# Patient Record
Sex: FEMALE | Race: BLACK OR AFRICAN AMERICAN | NOT HISPANIC OR LATINO | URBAN - METROPOLITAN AREA
[De-identification: names, ages, dates, MRNs, and addresses within clinical notes are randomized per-mention and may not be internally consistent; named-entity substitution may affect disease eponyms.]

---

## 2022-05-09 NOTE — PATIENT DISCUSSION
Patient going home with Trial. If patient appreciates the new brand she can call to finalize, if she would like to evaluate further she can schedule a CL Check.

## 2023-04-18 ENCOUNTER — FOLLOW UP (OUTPATIENT)
Dept: URBAN - METROPOLITAN AREA CLINIC 53 | Facility: CLINIC | Age: 88
End: 2023-04-18

## 2023-04-18 DIAGNOSIS — H40.1134: ICD-10-CM

## 2023-04-18 PROCEDURE — 92012 INTRM OPH EXAM EST PATIENT: CPT

## 2023-04-18 PROCEDURE — 92133 CPTRZD OPH DX IMG PST SGM ON: CPT

## 2023-04-18 ASSESSMENT — KERATOMETRY
OD_AXISANGLE2_DEGREES: 154
OS_AXISANGLE_DEGREES: 086
OD_K2POWER_DIOPTERS: 45.25
OD_K1POWER_DIOPTERS: 42.25
OD_AXISANGLE_DEGREES: 064
OS_K1POWER_DIOPTERS: 44.25
OS_K2POWER_DIOPTERS: 45.25
OS_AXISANGLE2_DEGREES: 176

## 2023-04-18 ASSESSMENT — VISUAL ACUITY
OS_CC: 20/30
OD_CC: 20/30-1
OU_CC: 20/30-1

## 2023-04-18 ASSESSMENT — TONOMETRY
OD_IOP_MMHG: 15
OD_IOP_MMHG: 18
OS_IOP_MMHG: 18
OS_IOP_MMHG: 15

## 2023-10-17 ENCOUNTER — FOLLOW UP (OUTPATIENT)
Dept: URBAN - METROPOLITAN AREA CLINIC 53 | Facility: CLINIC | Age: 88
End: 2023-10-17

## 2023-10-17 DIAGNOSIS — H31.011: ICD-10-CM

## 2023-10-17 DIAGNOSIS — H40.1131: ICD-10-CM

## 2023-10-17 PROCEDURE — 92083 EXTENDED VISUAL FIELD XM: CPT

## 2023-10-17 PROCEDURE — 92133 CPTRZD OPH DX IMG PST SGM ON: CPT

## 2023-10-17 PROCEDURE — 99213 OFFICE O/P EST LOW 20 MIN: CPT

## 2023-10-17 ASSESSMENT — TONOMETRY
OD_IOP_MMHG: 19
OS_IOP_MMHG: 15
OD_IOP_MMHG: 16
OS_IOP_MMHG: 18

## 2023-10-17 ASSESSMENT — VISUAL ACUITY
OS_CC: 20/30-1
OD_CC: 20/40-2

## 2024-07-09 ENCOUNTER — COMPREHENSIVE EXAM (OUTPATIENT)
Dept: URBAN - METROPOLITAN AREA CLINIC 53 | Facility: CLINIC | Age: 89
End: 2024-07-09

## 2024-07-09 DIAGNOSIS — H31.011: ICD-10-CM

## 2024-07-09 DIAGNOSIS — H40.1131: ICD-10-CM

## 2024-07-09 DIAGNOSIS — H35.3121: ICD-10-CM

## 2024-07-09 DIAGNOSIS — H02.403: ICD-10-CM

## 2024-07-09 DIAGNOSIS — H52.4: ICD-10-CM

## 2024-07-09 DIAGNOSIS — Z96.1: ICD-10-CM

## 2024-07-09 PROCEDURE — 92134 CPTRZ OPH DX IMG PST SGM RTA: CPT

## 2024-07-09 PROCEDURE — 92015 DETERMINE REFRACTIVE STATE: CPT

## 2024-07-09 PROCEDURE — 99213 OFFICE O/P EST LOW 20 MIN: CPT

## 2024-07-09 ASSESSMENT — TONOMETRY
OD_IOP_MMHG: 19
OS_IOP_MMHG: 16
OS_IOP_MMHG: 19
OD_IOP_MMHG: 16

## 2024-07-09 ASSESSMENT — VISUAL ACUITY
OD_CC: CF 2FT
OU_CC: J3
OS_CC: 20/60

## 2025-03-04 ENCOUNTER — FOLLOW UP (OUTPATIENT)
Age: OVER 89
End: 2025-03-04

## 2025-03-04 DIAGNOSIS — H40.1131: ICD-10-CM

## 2025-03-04 PROCEDURE — 92133 CPTRZD OPH DX IMG PST SGM ON: CPT

## 2025-03-04 PROCEDURE — 92083 EXTENDED VISUAL FIELD XM: CPT

## 2025-03-04 PROCEDURE — 99214 OFFICE O/P EST MOD 30 MIN: CPT
